# Patient Record
Sex: FEMALE | Race: ASIAN
[De-identification: names, ages, dates, MRNs, and addresses within clinical notes are randomized per-mention and may not be internally consistent; named-entity substitution may affect disease eponyms.]

---

## 2019-11-13 ENCOUNTER — HOSPITAL ENCOUNTER (EMERGENCY)
Dept: HOSPITAL 72 - EMR | Age: 39
Discharge: HOME | End: 2019-11-13
Payer: MEDICARE

## 2019-11-13 VITALS — SYSTOLIC BLOOD PRESSURE: 112 MMHG | DIASTOLIC BLOOD PRESSURE: 75 MMHG

## 2019-11-13 VITALS — HEIGHT: 65 IN | WEIGHT: 176 LBS | BODY MASS INDEX: 29.32 KG/M2

## 2019-11-13 VITALS — DIASTOLIC BLOOD PRESSURE: 74 MMHG | SYSTOLIC BLOOD PRESSURE: 109 MMHG

## 2019-11-13 DIAGNOSIS — S61.211A: Primary | ICD-10-CM
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
## 2019-11-13 DIAGNOSIS — Z23: ICD-10-CM
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
## 2019-11-13 PROCEDURE — 90715 TDAP VACCINE 7 YRS/> IM: CPT

## 2019-11-13 PROCEDURE — 90471 IMMUNIZATION ADMIN: CPT

## 2019-11-13 PROCEDURE — 29130 APPL FINGER SPLINT STATIC: CPT

## 2019-11-13 PROCEDURE — 99283 EMERGENCY DEPT VISIT LOW MDM: CPT

## 2019-11-13 NOTE — EMERGENCY ROOM REPORT
History of Present Illness


General


Chief Complaint:  Laceration


Source:  Patient





Present Illness


HPI


39-year-old female presents to the emergency department complaining of open 

laceration to the left index finger which she sustained prior to arrival while 

cutting frozen sausages.  Patient denies intentional self-harm.  Patient denies 

taking blood thinning medications and she is not sure when her last tetanus 

vaccination was.  Patient reports no pain at this time however she does have 

exacerbation of pain upon palpation or movements of the finger.  She denies 

suspicion of foreign body.  She denies loss of gross motor movements of the 

affected finger,and denies paresthesias/loss of sensation of the affected 

finger.


Allergies:  


Coded Allergies:  


     No Known Allergies (Unverified , 8/16/13)





Patient History


Past Medical History:  see triage record


Past Surgical History:  none


Pertinent Family History:  none


Last Menstrual Period:  CURRENTLY ON HER MENSTRUAL PERIOD


Pregnant Now:  No


Reviewed Nursing Documentation:  PMH: Agreed; PSxH: Agreed





Nursing Documentation-PMH


Past Medical History:  No Stated History





Review of Systems


All Other Systems:  negative except mentioned in HPI





Physical Exam





Vital Signs








  Date Time  Temp Pulse Resp B/P (MAP) Pulse Ox O2 Delivery O2 Flow Rate FiO2


 


11/13/19 17:42 98.1 90 20 109/74 (86) 97 Room Air  








Sp02 EP Interpretation:  reviewed, normal


General Appearance:  no apparent distress, alert, GCS 15, non-toxic


Head:  normocephalic, atraumatic


Eyes:  bilateral eye normal inspection, bilateral eye PERRL


ENT:  hearing grossly normal, normal voice


Neck:  full range of motion


Respiratory:  lungs clear, normal breath sounds, speaking full sentences


Cardiovascular #1:  regular rate, rhythm, normal capillary refill


Musculoskeletal:  back normal, gait/station normal, normal range of motion, non-

tender


Neurologic:  alert, oriented x3, responsive, motor strength/tone normal, 

sensory intact, speech normal, grossly normal


Psychiatric:  judgement/insight normal


Skin:  laceration -  Left index finger  laceration approx  4 cm in length, no 

visible fb or tendon or vasculature involvement, full range of motion with 

isolation of the DIP of the left index finger.





Procedures


Laceration/Wound Repair


Laceration/Wound Repair :  


   Consent:  Verbal


   Wound Location:  upper extremity - left index finger


   Wound's Depth, Shape:  irregular


   Wound Length (cm):  4


   Wound Explored:  clean


   Irrigated w/ Saline (ccs):  200


   Betadine Prep?:  No


   Anesthesia:  Lidocaine w/ Epi


   Volume Anesthetic (ccs):  4


   Wound Repaired With:  sutures


   Suture Size/Type:  4:0


   Number of Sutures:  7


   Layer Closure?:  No


   Sterile Dressing Applied?:  Yes


   Splint Applied?:  Yes


   Type of Splint Applied:  finger splint


   Sling Applied?:  No


   Patient Tolerated:  Well


   Complications:  None





Medical Decision Making


PA Attestation


Dr. Booth Is my supervising Physician whom patient management has been 

discussed with.


Diagnostic Impression:  


 Primary Impression:  


 Laceration


ER Course


39-year-old female presents to the emergency department complaining of open 

laceration to the left index finger which she sustained prior to arrival while 

cutting frozen sausages.  Patient denies intentional self-harm.  Patient denies 

taking blood thinning medications and she is not sure when her last tetanus 

vaccination was.  Patient reports no pain at this time however she does have 

exacerbation of pain upon palpation or movements of the finger.  She denies 

suspicion of foreign body.  She denies loss of gross motor movements of the 

affected finger,and denies paresthesias/loss of sensation of the affected 

finger.





Ddx considered but are not limited to laceration, tendon injury, cellulitis, 

amputation





Vital signs: are WNL, pt. is afebrile


H&PE are most consistent with:  Left index finger  laceration approx  4 cm in 

length, no visible tendon or vasculature involvement, full range of motion with 

isolation of the DIP of the left index finger.





ORDERS: none required at this time, the diagnosis is clinical





ED INTERVENTIONS: 


-Tetanus vaccine was administered as pt. vaccination status was  unknown.


- The wound was copiously irrigated with normal saline, and explored for 

foreign body for which no FB  was found. 





- pt. is anesthetized with 1%lidocaine 4cc


- The wound was approximated and closed using 7 interrupted 4.0 Ethilon sutures.


-Bacitracin and sterile dressing is applied.


-Finger Splint applied to the left index finger by ED tech. Pt. remains 

neurovascularly intact.





Discussed with patient:  That we make every effort to approximate the 

laceration as best as we can so that scarring will be as cosmetically pleasing 

as possible with our limited cosmetic skill set in the Emergency dept.  

Regardless of our best efforts there will be scarring after laceration repair.  

The extent of scarring is unknown at this time.  





DISCHARGE: At this time pt. is stable for d/c to home. Will provide printed 

patient care instructions, and any necessary prescriptions. Care plan and 

follow up instructions have been discussed with the patient prior to discharge.





Last Vital Signs








  Date Time  Temp Pulse Resp B/P (MAP) Pulse Ox O2 Delivery O2 Flow Rate FiO2


 


11/13/19 17:42 98.1 90 20 109/74 (86) 97 Room Air  








Disposition:  HOME, SELF-CARE


Condition:  Stable


Scripts


Acetaminophen* (TYLENOL EXTRA STRENGTH*) 500 Mg Tablet


500 MG ORAL Q6H, #20 TAB 0 Refills


   Prov: Taylor Brewer         11/13/19 


Cephalexin* (KEFLEX*) 500 Mg Capsule


500 MG ORAL EVERY 12 HOURS for 7 Days, #14 CAP 0 Refills


   Prov: Taylor Brewer         11/13/19 


Bacitracin/Polymyxin B Sulfate (BACITRACIN-POLYMYXIN OINTMENT) 28.35 Gm 

Oint...g.


1 APPLIC TP BID, #28.3 GM


   Prov: Taylor Brewer         11/13/19


Patient Instructions:  Laceration Care, Adult





Additional Instructions:  


Take medications as directed. 





 ** Follow up with a Primary Care Provider in 3-5 days, even if your symptoms 

have resolved. ** 





--Please review list of primary care clinics, if you do not already have a 

primary care provider





Return sooner to ED if new symptoms occur, or current symptoms become worse. 








- Please note that this Emergency Department Report was dictated using [employer] technology software, occasionally this can lead to 

erroneous entry secondary to interpretation by the dictation equipment.











Taylor Brweer Nov 13, 2019 20:25

## 2019-11-13 NOTE — NUR
ED Nurse Note:



ER ANYA Vazquez at bedside performing lac repair. pt tolerating procedure well, 
does not appear to be in any distress at this time. breathing is even and 
unlabored

## 2019-11-13 NOTE — NUR
ED Nurse Note:



pt presents to ED with a L index finger lac that happened 45 min PTA. pt states 
she was cutting soemthing to prepare dinner and accidentally cut her finger on 
the knife. she does not know if immunizations are UTD. pt reports 3/10 
"stinging" px

## 2021-02-16 ENCOUNTER — HOSPITAL ENCOUNTER (EMERGENCY)
Dept: HOSPITAL 72 - EMR | Age: 41
Discharge: HOME | End: 2021-02-16
Payer: COMMERCIAL

## 2021-02-16 VITALS — BODY MASS INDEX: 28.32 KG/M2 | HEIGHT: 65 IN | WEIGHT: 170 LBS

## 2021-02-16 VITALS — DIASTOLIC BLOOD PRESSURE: 81 MMHG | SYSTOLIC BLOOD PRESSURE: 135 MMHG
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
## 2021-02-16 DIAGNOSIS — S82.124A: Primary | ICD-10-CM

## 2021-02-16 PROCEDURE — 99283 EMERGENCY DEPT VISIT LOW MDM: CPT

## 2021-02-16 PROCEDURE — 29515 APPLICATION SHORT LEG SPLINT: CPT

## 2021-02-16 NOTE — EMERGENCY ROOM REPORT
History of Present Illness


General


Chief Complaint:  Lower Extremity Injury


Source:  Patient


 (Taylor Brewer)





Present Illness


HPI


40-year-old female presents to the emergency department complaining of 9 out of 

10 severity pain, swelling, tenderness and bruising to the right ankle status 

post mechanical fall after she hopped over a 4 foot fence earlier this morning. 

Patient reports she was locked out of her house and she hopped over a short 

friend's in the front yard.  Patient reports acute onset of her pain and she 

states that she rolled her right ankle.  She reports that she was able to bear 

weight however throughout the day her symptoms became worse.  Patient reports 

swelling was immediate.  She states she has not taken any medications for her 

pain.  She denies significant past medical history other than schizoaffective 

disorder and she states that she takes Abilify daily. She denies pregnancy or 

suspicion of pregnancy. She denies hitting her head or having LOC. She denies 

midline neck or back pain. She denies paresthesias. She denies open wounds or 

bleeding. 


 (Taylor Brewer)


Allergies:  


Coded Allergies:  


     No Known Allergies (Unverified , 1/4/20)





COVID-19 Screening


Contact w/high risk pt:  No


Experienced COVID-19 symptoms?:  No


COVID-19 Testing performed PTA:  No


 (Taylor Brewer)





Patient History


Past Medical History:  see triage record


Past Surgical History:  none


Pertinent Family History:  none


Pregnant Now:  No


Reviewed Nursing Documentation:  PMH: Agreed; PSxH: Agreed (Taylor Brewer)





Nursing Documentation-PMH


Past Medical History:  No History, Except For


History Of Psychiatric Problem:  Yes - psychosis


 (Taylor Brewer)





Review of Systems


All Other Systems:  negative except mentioned in HPI


 (Taylor Brewer)





Physical Exam





Vital Signs








  Date Time  Temp Pulse Resp B/P (MAP) Pulse Ox O2 Delivery O2 Flow Rate FiO2


 


2/16/21 19:55 98.1 86 18 135/81 (99) 100 Room Air  








Sp02 EP Interpretation:  reviewed, normal


General Appearance:  no apparent distress, alert, GCS 15, non-toxic


Head:  normocephalic, atraumatic


Eyes:  bilateral eye normal inspection, bilateral eye PERRL


ENT:  hearing grossly normal, normal voice


Neck:  full range of motion, no bony tend


Respiratory:  chest non-tender, lungs clear, normal breath sounds, speaking full

sentences


Cardiovascular #1:  regular rate, rhythm, normal capillary refill


Cardiovascular #2:  2+ dorsalis pedis (R)


Musculoskeletal:  back normal, tender - Right ankle- lateral aspect, Swelling at

the right ankle joint, bruising to both sides of the right ankle.  NVI.


Neurologic:  alert, motor strength/tone normal, oriented x3, sensory intact, 

responsive, speech normal


Psychiatric:  judgement/insight normal


Skin:  Ecchymosis/Bruising - lateral and medial aspect of the right ankle. 


 (Taylor Brewer)





Procedures


Splinting


Splinting :  


   Consent:  Verbal


   Location:  Right ankle


   Hand-Made Type:  plaster


   Splint:  Posterior short with sugar tong


   Pre-Proc Neuro Vasc Exam:  normal


   Post-Proc Neuro Vasc Exam:  normal


   Patient Tolerated:  Well


   Complications:  None


Progress


Patient has a nondisplaced distal fibular fracture.  She was placed in a 

posterior short splint with sugar tong.  Neurovascular intact started and 

procedure.  No complications.


 (Jef Landaverde MD)





Medical Decision Making


PA Attestation


Dr. Landaverde is my supervising Physician whom patient management has been 

discussed with. 


 (Taylor Brewer)


Diagnostic Impression:  


   Primary Impression:  


   Right tibial fracture


   Qualified Codes:  S82.124A - Nondisplaced fracture of lateral condyle of 

   right tibia, initial encounter for closed fracture


ER Course


40-year-old female presents to the emergency department complaining of 9 out of 

10 severity pain, swelling, tenderness and bruising to the right ankle status 

post mechanical fall after she hopped over a 4 foot fence earlier this morning. 

Patient reports she was locked out of her house and she hopped over a short 

friend's in the front yard.  Patient reports acute onset of her pain and she 

states that she rolled her right ankle.  She reports that she was able to bear 

weight however throughout the day her symptoms became worse.  Patient reports 

swelling was immediate.  She states she has not taken any medications for her 

pain.  She denies significant past medical history other than schizoaffective 

disorder and she states that she takes Abilify daily. She denies pregnancy or 

suspicion of pregnancy. She denies hitting her head or having LOC. She denies 

midline neck or back pain. She denies paresthesias. She denies open wounds or 

bleeding. 





Ddx considered but are not limited to Fracture, dislocation, contusion,  

Sprain/Strain/Spasm,   Epidural abscess,  Neoplastic mets.





Vital signs: are WNL, pt. is afebrile


H&PE are most consistent with musculoskeletal injury will perform imaging to r/o

fractures/dislocations. 





ORDERS:





-  X-ray Right Ankle 3 views   -  POSITIVE for nondisplaced distal tibia fx., NO

Dislocation, or significant soft tissue injury, per preliminary read in ED, and 

signed by  ANYA Brewer, my supervising physician has reviewed, and agrees 

with my interpretation.





ED INTERVENTIONS:





- Norco 5mg PO





Right short leg posterior with stirrup splint applied  by Dr. Landaverde. Pt. eva

ins neurovascularly intact.








DISCHARGE: At this time pt. is stable for d/c to home. Will provide printed 

patient care instructions, and any necessary prescriptions. Care plan and follow

up instructions have been discussed with the patient prior to discharge.


 (Taylor Brewer)





Other X-Ray Diagnostic Results


Other X-Ray Diagnostic Results :  


   X-Ray ordered:   X-ray Right Ankle 3 views


   # of Views/Limited Vs Complete:  3 View


   Indication:  Pain


   EP Interpretation:  Yes


   PA Xray:  Interpretation reviewed, by supervising MD, and agrees with 

findings.


   Interpretation:  no dislocation, no soft tissue swelling, other - 

nondisplaced distal tibial fx.


   Impression:  Other - abnormal


   Electronically Signed by:  Taylor Brewer PA-C


 (Taylor Brewer)





Last Vital Signs








  Date Time  Temp Pulse Resp B/P (MAP) Pulse Ox O2 Delivery O2 Flow Rate FiO2


 


2/16/21 19:55 98.1 86 18 135/81 (99) 100 Room Air  








 (Taylor Brewer)


Disposition:  HOME, SELF-CARE


Condition:  Stable


Scripts


Ibuprofen* (MOTRIN*) 600 Mg Tablet


600 MG ORAL THREE TIMES A DAY, #20 TAB


   Prov: Taylor Brewer         2/16/21 


Hydrocodone/Acetaminophen 5-325* (HYDROCODONE/ACETAMINOPHEN 5-325*) 1 Each 

Tablet


1 TAB ORAL Q6H PRN for For Pain, #12 TAB 0 Refills


   Prov: Taylor Brewer         2/16/21


Referrals:  


Orthopedic Urgent Care


Patient Instructions:  Ankle Fracture





Additional Instructions:  


Take medications as directed. 


 !! Do not drink alcohol, drive, or operate heavy machinery while taking Norco 

as this may cause drowsiness. 








 ** Follow up with an ORTHOPEDIC SPECIALIST in 3-5 days, even if your symptoms 

have resolved. ** 





If symptoms persist MRI may be required at the discretion of your PCP or Ortho 

Specialist.  ** 





--Please review list of primary care clinics, if you do not already have a 

primary care provider who can give you an Orthopedic Referral. 





Return sooner to ED if new symptoms occur, or current symptoms become worse. 











- Please note that this Emergency Department Report was dictated using [employer] technology software, occasionally this can lead to 

erroneous entry secondary to interpretation by the dictation equipment.











Taylor Brewer              Feb 16, 2021 20:34


Jef Landaverde MD              Feb 16, 2021 21:13

## 2022-08-25 ENCOUNTER — HOSPITAL ENCOUNTER (EMERGENCY)
Dept: HOSPITAL 15 - ER | Age: 42
Discharge: HOME | End: 2022-08-25
Payer: MEDICARE

## 2022-08-25 VITALS — BODY MASS INDEX: 32.29 KG/M2 | HEIGHT: 65 IN | WEIGHT: 193.79 LBS

## 2022-08-25 VITALS — SYSTOLIC BLOOD PRESSURE: 125 MMHG | DIASTOLIC BLOOD PRESSURE: 89 MMHG
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
## 2022-08-25 DIAGNOSIS — S60.211A: Primary | ICD-10-CM
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
## 2022-08-25 DIAGNOSIS — S90.01XA: ICD-10-CM

## 2022-08-25 PROCEDURE — 73600 X-RAY EXAM OF ANKLE: CPT

## 2022-08-25 PROCEDURE — 73100 X-RAY EXAM OF WRIST: CPT
